# Patient Record
Sex: FEMALE | Race: WHITE | NOT HISPANIC OR LATINO | Employment: FULL TIME | URBAN - METROPOLITAN AREA
[De-identification: names, ages, dates, MRNs, and addresses within clinical notes are randomized per-mention and may not be internally consistent; named-entity substitution may affect disease eponyms.]

---

## 2018-10-02 ENCOUNTER — HOSPITAL ENCOUNTER (EMERGENCY)
Facility: HOSPITAL | Age: 43
Discharge: HOME | End: 2018-10-02
Attending: EMERGENCY MEDICINE | Admitting: NURSE PRACTITIONER

## 2018-10-02 ENCOUNTER — APPOINTMENT (OUTPATIENT)
Dept: GENERAL RADIOLOGY | Facility: HOSPITAL | Age: 43
End: 2018-10-02

## 2018-10-02 VITALS
WEIGHT: 140 LBS | DIASTOLIC BLOOD PRESSURE: 75 MMHG | RESPIRATION RATE: 14 BRPM | BODY MASS INDEX: 20.73 KG/M2 | OXYGEN SATURATION: 100 % | SYSTOLIC BLOOD PRESSURE: 128 MMHG | TEMPERATURE: 98.1 F | HEIGHT: 69 IN | HEART RATE: 76 BPM

## 2018-10-02 DIAGNOSIS — S90.822A BLISTER (NONTHERMAL), LEFT FOOT, INITIAL ENCOUNTER: ICD-10-CM

## 2018-10-02 DIAGNOSIS — M79.672 LEFT FOOT PAIN: Primary | ICD-10-CM

## 2018-10-02 DIAGNOSIS — L03.116 CELLULITIS OF LEFT FOOT: ICD-10-CM

## 2018-10-02 LAB
ALBUMIN SERPL-MCNC: 4.19 G/DL (ref 3.2–4.8)
ALBUMIN/GLOB SERPL: 1.8 G/DL (ref 1.5–2.5)
ALP SERPL-CCNC: 51 U/L (ref 25–100)
ALT SERPL W P-5'-P-CCNC: 16 U/L (ref 7–40)
ANION GAP SERPL CALCULATED.3IONS-SCNC: 4 MMOL/L (ref 3–11)
AST SERPL-CCNC: 20 U/L (ref 0–33)
BASOPHILS # BLD AUTO: 0.06 10*3/MM3 (ref 0–0.2)
BASOPHILS NFR BLD AUTO: 1.4 % (ref 0–1)
BILIRUB SERPL-MCNC: 0.9 MG/DL (ref 0.3–1.2)
BUN BLD-MCNC: 12 MG/DL (ref 9–23)
BUN/CREAT SERPL: 12.1 (ref 7–25)
CALCIUM SPEC-SCNC: 9.1 MG/DL (ref 8.7–10.4)
CHLORIDE SERPL-SCNC: 106 MMOL/L (ref 99–109)
CO2 SERPL-SCNC: 27 MMOL/L (ref 20–31)
CREAT BLD-MCNC: 0.99 MG/DL (ref 0.6–1.3)
CRP SERPL-MCNC: 0.06 MG/DL (ref 0–1)
D-LACTATE SERPL-SCNC: 1.6 MMOL/L (ref 0.5–2)
DEPRECATED RDW RBC AUTO: 48.2 FL (ref 37–54)
EOSINOPHIL # BLD AUTO: 0.22 10*3/MM3 (ref 0–0.3)
EOSINOPHIL NFR BLD AUTO: 5 % (ref 0–3)
ERYTHROCYTE [DISTWIDTH] IN BLOOD BY AUTOMATED COUNT: 13.2 % (ref 11.3–14.5)
ERYTHROCYTE [SEDIMENTATION RATE] IN BLOOD: 11 MM/HR (ref 0–20)
GFR SERPL CREATININE-BSD FRML MDRD: 61 ML/MIN/1.73
GLOBULIN UR ELPH-MCNC: 2.3 GM/DL
GLUCOSE BLD-MCNC: 109 MG/DL (ref 70–100)
HCT VFR BLD AUTO: 35.8 % (ref 34.5–44)
HGB BLD-MCNC: 11.8 G/DL (ref 11.5–15.5)
IMM GRANULOCYTES # BLD: 0 10*3/MM3 (ref 0–0.03)
IMM GRANULOCYTES NFR BLD: 0 % (ref 0–0.6)
LYMPHOCYTES # BLD AUTO: 0.61 10*3/MM3 (ref 0.6–4.8)
LYMPHOCYTES NFR BLD AUTO: 13.8 % (ref 24–44)
MCH RBC QN AUTO: 33.1 PG (ref 27–31)
MCHC RBC AUTO-ENTMCNC: 33 G/DL (ref 32–36)
MCV RBC AUTO: 100.3 FL (ref 80–99)
MONOCYTES # BLD AUTO: 0.37 10*3/MM3 (ref 0–1)
MONOCYTES NFR BLD AUTO: 8.4 % (ref 0–12)
NEUTROPHILS # BLD AUTO: 3.16 10*3/MM3 (ref 1.5–8.3)
NEUTROPHILS NFR BLD AUTO: 71.4 % (ref 41–71)
PLATELET # BLD AUTO: 196 10*3/MM3 (ref 150–450)
PMV BLD AUTO: 11.4 FL (ref 6–12)
POTASSIUM BLD-SCNC: 3.6 MMOL/L (ref 3.5–5.5)
PROCALCITONIN SERPL-MCNC: <0.05 NG/ML
PROT SERPL-MCNC: 6.5 G/DL (ref 5.7–8.2)
RBC # BLD AUTO: 3.57 10*6/MM3 (ref 3.89–5.14)
SODIUM BLD-SCNC: 137 MMOL/L (ref 132–146)
WBC NRBC COR # BLD: 4.42 10*3/MM3 (ref 3.5–10.8)

## 2018-10-02 PROCEDURE — 87252 VIRUS INOCULATION TISSUE: CPT | Performed by: NURSE PRACTITIONER

## 2018-10-02 PROCEDURE — 87205 SMEAR GRAM STAIN: CPT | Performed by: NURSE PRACTITIONER

## 2018-10-02 PROCEDURE — 83605 ASSAY OF LACTIC ACID: CPT | Performed by: NURSE PRACTITIONER

## 2018-10-02 PROCEDURE — 87070 CULTURE OTHR SPECIMN AEROBIC: CPT | Performed by: NURSE PRACTITIONER

## 2018-10-02 PROCEDURE — 84145 PROCALCITONIN (PCT): CPT | Performed by: NURSE PRACTITIONER

## 2018-10-02 PROCEDURE — 73630 X-RAY EXAM OF FOOT: CPT

## 2018-10-02 PROCEDURE — 80053 COMPREHEN METABOLIC PANEL: CPT | Performed by: NURSE PRACTITIONER

## 2018-10-02 PROCEDURE — 85025 COMPLETE CBC W/AUTO DIFF WBC: CPT | Performed by: NURSE PRACTITIONER

## 2018-10-02 PROCEDURE — 86140 C-REACTIVE PROTEIN: CPT | Performed by: NURSE PRACTITIONER

## 2018-10-02 PROCEDURE — 99283 EMERGENCY DEPT VISIT LOW MDM: CPT

## 2018-10-02 RX ORDER — SULFAMETHOXAZOLE AND TRIMETHOPRIM 800; 160 MG/1; MG/1
2 TABLET ORAL 2 TIMES DAILY
Qty: 40 TABLET | Refills: 0 | Status: SHIPPED | OUTPATIENT
Start: 2018-10-02

## 2018-10-02 RX ORDER — SULFAMETHOXAZOLE AND TRIMETHOPRIM 800; 160 MG/1; MG/1
1 TABLET ORAL 2 TIMES DAILY
COMMUNITY
End: 2018-10-02

## 2018-10-02 RX ORDER — HYDROCODONE BITARTRATE AND ACETAMINOPHEN 5; 325 MG/1; MG/1
1-2 TABLET ORAL EVERY 6 HOURS PRN
Qty: 12 TABLET | Refills: 0 | Status: SHIPPED | OUTPATIENT
Start: 2018-10-02

## 2018-10-02 NOTE — ED PROVIDER NOTES
Subjective   Tory Jimenez is a 43 y.o.female who presents to the ED with c/o of a rash with onset four days ago. She complains of redness and swelling to the top of the left foot since that time, and blistering to the foot, which began the following day. She reports the rash has worsened since that time and radiates up her LLE and onto her abdomen. The patient complains of increased warmth and itchiness to her left foot. She reports minor difficulty ambulating due to the discomfort, which is improving. She reports she went to a [facility] on Friday and was given a steroid shot at that time, with no relief. she states she went to the [facility] ED yesterday and was prescribed antibiotics. She reports they took blood cultures, and she was told her blister may be due to a brown recluse bite. In addition, she reports she called Infectious Disease, who informed her they would see her on an outpatient basis this Thursday. There are no other acute complaints at this time. 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        History provided by:  Patient  Rash   Location:  Foot, leg and torso  Torso rash location: general abdomen.  Leg rash location:  L foot and L leg  Foot rash location:  Top of L foot  Quality: blistering, itchiness and redness    Quality comment:  Warmth  Severity:  Moderate  Onset quality:  Sudden  Duration:  4 days  Timing:  Constant  Progression:  Worsening  Chronicity:  New  Context: not sick contacts    Relieved by:  Nothing  Worsened by:  Nothing  Ineffective treatments: antibiotics, steroid.      Review of Systems   Skin: Positive for rash (to the top of the left foot, radiating up the left leg, and on her abdomen).   All other systems reviewed and are negative.      History reviewed. No pertinent past medical history.    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 No pertinent surgical history.    History reviewed. No pertinent  family history.    Social History     Social History   • Marital status:      Social History Main Topics   • Smoking status: Never Smoker   • Smokeless tobacco: Never Used   • Alcohol use Yes      Comment: SOCIALLY    • Drug use: No   • Sexual activity: Defer     Other Topics Concern   • Not on file         Objective   Physical Exam   Constitutional: She is oriented to person, place, and time. She appears well-developed and well-nourished. No distress.   HENT:   Head: Normocephalic and atraumatic.   Eyes: Conjunctivae are normal. No scleral icterus.   Neck: Normal range of motion. Neck supple.   Cardiovascular: Normal rate, regular rhythm and normal heart sounds.    No murmur heard.  Pulmonary/Chest: Effort normal and breath sounds normal. No respiratory distress.   Abdominal: Soft. Bowel sounds are normal. There is no tenderness. There is no rebound and no guarding.   Musculoskeletal: Normal range of motion. She exhibits no tenderness.   Neurological: She is alert and oriented to person, place, and time.   Skin: Skin is warm and dry. Rash noted. Rash is maculopapular. She is not diaphoretic. There is erythema.   Mild cellulitis to the left foot. Painful movement in the toes. Multiple small blisters on the dorsal aspect of the left foot. Scant amount of maculopapular spots up the left leg and onto the abdomen, which I am not sure is associated with the cellulitis on the foot.   Psychiatric: She has a normal mood and affect. Her behavior is normal.   Nursing note and vitals reviewed.      Procedures         ED Course  ED Course as of Oct 02 1615   Tue Oct 02, 2018   3424 Didier is at bedside reevaluating the patient. He is discussing findings and plans for discharge and follow up with Infectious Disease in two days. All are agreeable.  [CT]   1611 Pt already on bactrim. Will increase to 2 pills BID and she also had a friend PAc who called in keflex 500mg qid.    She has fu with ID in two days. Foot does not  appear necrotic. Broad abx coverate and close fu are reasonable. Strict warning ss.    I did confirm her apt on thur with Dr. Alvarez. Poc with dr. Vang.    Pt thankful and agreeable with tx poc. Well aware of the ss of worsening condition.    [JM]      ED Course User Index  [CT] ZaylaDandre  [JM] Didier Zapata APRN      Recent Results (from the past 24 hour(s))   Comprehensive Metabolic Panel    Collection Time: 10/02/18  1:52 PM   Result Value Ref Range    Glucose 109 (H) 70 - 100 mg/dL    BUN 12 9 - 23 mg/dL    Creatinine 0.99 0.60 - 1.30 mg/dL    Sodium 137 132 - 146 mmol/L    Potassium 3.6 3.5 - 5.5 mmol/L    Chloride 106 99 - 109 mmol/L    CO2 27.0 20.0 - 31.0 mmol/L    Calcium 9.1 8.7 - 10.4 mg/dL    Total Protein 6.5 5.7 - 8.2 g/dL    Albumin 4.19 3.20 - 4.80 g/dL    ALT (SGPT) 16 7 - 40 U/L    AST (SGOT) 20 0 - 33 U/L    Alkaline Phosphatase 51 25 - 100 U/L    Total Bilirubin 0.9 0.3 - 1.2 mg/dL    eGFR Non African Amer 61 >60 mL/min/1.73    Globulin 2.3 gm/dL    A/G Ratio 1.8 1.5 - 2.5 g/dL    BUN/Creatinine Ratio 12.1 7.0 - 25.0    Anion Gap 4.0 3.0 - 11.0 mmol/L   Sedimentation Rate    Collection Time: 10/02/18  1:52 PM   Result Value Ref Range    Sed Rate 11 0 - 20 mm/hr   C-reactive Protein    Collection Time: 10/02/18  1:52 PM   Result Value Ref Range    C-Reactive Protein 0.06 0.00 - 1.00 mg/dL   Procalcitonin    Collection Time: 10/02/18  1:52 PM   Result Value Ref Range    Procalcitonin <0.05 <=0.25 ng/mL   Lactic Acid, Plasma    Collection Time: 10/02/18  1:52 PM   Result Value Ref Range    Lactate 1.6 0.5 - 2.0 mmol/L   CBC Auto Differential    Collection Time: 10/02/18  1:52 PM   Result Value Ref Range    WBC 4.42 3.50 - 10.80 10*3/mm3    RBC 3.57 (L) 3.89 - 5.14 10*6/mm3    Hemoglobin 11.8 11.5 - 15.5 g/dL    Hematocrit 35.8 34.5 - 44.0 %    .3 (H) 80.0 - 99.0 fL    MCH 33.1 (H) 27.0 - 31.0 pg    MCHC 33.0 32.0 - 36.0 g/dL    RDW 13.2 11.3 - 14.5 %    RDW-SD 48.2 37.0 - 54.0 fl     "MPV 11.4 6.0 - 12.0 fL    Platelets 196 150 - 450 10*3/mm3    Neutrophil % 71.4 (H) 41.0 - 71.0 %    Lymphocyte % 13.8 (L) 24.0 - 44.0 %    Monocyte % 8.4 0.0 - 12.0 %    Eosinophil % 5.0 (H) 0.0 - 3.0 %    Basophil % 1.4 (H) 0.0 - 1.0 %    Immature Grans % 0.0 0.0 - 0.6 %    Neutrophils, Absolute 3.16 1.50 - 8.30 10*3/mm3    Lymphocytes, Absolute 0.61 0.60 - 4.80 10*3/mm3    Monocytes, Absolute 0.37 0.00 - 1.00 10*3/mm3    Eosinophils, Absolute 0.22 0.00 - 0.30 10*3/mm3    Basophils, Absolute 0.06 0.00 - 0.20 10*3/mm3    Immature Grans, Absolute 0.00 0.00 - 0.03 10*3/mm3     Note: In addition to lab results from this visit, the labs listed above may include labs taken at another facility or during a different encounter within the last 24 hours. Please correlate lab times with ED admission and discharge times for further clarification of the services performed during this visit.    XR Foot 3+ View Left   Final Result   Forefoot soft tissue swelling without acute fracture,   dislocation or malalignment.       D:  10/02/2018   E:  10/02/2018       This report was finalized on 10/2/2018 3:02 PM by Navarro Santo.            Vitals:    10/02/18 1244   BP: 128/75   BP Location: Left arm   Patient Position: Sitting   Pulse: 76   Resp: 14   Temp: 98.1 °F (36.7 °C)   TempSrc: Oral   SpO2: 100%   Weight: 63.5 kg (140 lb)   Height: 175.3 cm (69\")     Medications - No data to display  ECG/EMG Results (last 24 hours)     ** No results found for the last 24 hours. **                    XR Foot 3+ View Left   Final Result   Forefoot soft tissue swelling without acute fracture,   dislocation or malalignment.       D:  10/02/2018   E:  10/02/2018       This report was finalized on 10/2/2018 3:02 PM by Navarro Santo.                    MDM    Final diagnoses:   Left foot pain   Cellulitis of left foot   Blister (nonthermal), left foot, initial encounter       Documentation assistance provided by pat Camacho.  Information " recorded by the scribe was done at my direction and has been verified and validated by me.     Dandre Camacho  10/02/18 1341       Didier Zapata APRN  10/02/18 1615       Didier Zapata APRN  10/02/18 1612

## 2018-10-03 LAB — VIRAL CULTURE, GENERAL: NORMAL

## 2018-10-05 LAB
BACTERIA SPEC AEROBE CULT: NORMAL
GRAM STN SPEC: NORMAL
GRAM STN SPEC: NORMAL